# Patient Record
Sex: MALE | Race: WHITE | NOT HISPANIC OR LATINO | Employment: OTHER | URBAN - NONMETROPOLITAN AREA
[De-identification: names, ages, dates, MRNs, and addresses within clinical notes are randomized per-mention and may not be internally consistent; named-entity substitution may affect disease eponyms.]

---

## 2022-04-22 ENCOUNTER — OFFICE VISIT (OUTPATIENT)
Dept: FAMILY MEDICINE CLINIC | Facility: CLINIC | Age: 57
End: 2022-04-22

## 2022-04-22 VITALS
HEART RATE: 79 BPM | OXYGEN SATURATION: 94 % | SYSTOLIC BLOOD PRESSURE: 110 MMHG | HEIGHT: 73 IN | DIASTOLIC BLOOD PRESSURE: 68 MMHG | TEMPERATURE: 97.1 F | WEIGHT: 156.4 LBS | BODY MASS INDEX: 20.73 KG/M2

## 2022-04-22 DIAGNOSIS — N52.9 ERECTILE DYSFUNCTION, UNSPECIFIED ERECTILE DYSFUNCTION TYPE: ICD-10-CM

## 2022-04-22 DIAGNOSIS — R63.4 WEIGHT LOSS, ABNORMAL: Primary | ICD-10-CM

## 2022-04-22 PROBLEM — J43.9 PULMONARY EMPHYSEMA: Status: ACTIVE | Noted: 2022-04-22

## 2022-04-22 PROBLEM — E78.5 HYPERLIPIDEMIA: Status: ACTIVE | Noted: 2022-04-22

## 2022-04-22 PROCEDURE — 36415 COLL VENOUS BLD VENIPUNCTURE: CPT | Performed by: FAMILY MEDICINE

## 2022-04-22 PROCEDURE — 99214 OFFICE O/P EST MOD 30 MIN: CPT | Performed by: FAMILY MEDICINE

## 2022-04-22 RX ORDER — SILDENAFIL 50 MG/1
50 TABLET, FILM COATED ORAL DAILY PRN
Qty: 30 TABLET | Refills: 5 | Status: SHIPPED | OUTPATIENT
Start: 2022-04-22

## 2022-04-22 RX ORDER — ALBUTEROL SULFATE 90 UG/1
AEROSOL, METERED RESPIRATORY (INHALATION) SEE ADMIN INSTRUCTIONS
COMMUNITY
Start: 2022-02-09 | End: 2023-03-13

## 2022-04-22 RX ORDER — FLUTICASONE PROPIONATE 50 MCG
SPRAY, SUSPENSION (ML) NASAL
COMMUNITY
Start: 2022-03-09

## 2022-04-22 NOTE — ASSESSMENT & PLAN NOTE
I am going to recheck thyroid levels today and if normal we will proceed with CT chest, abdomen and pelvis with contrast.

## 2022-04-22 NOTE — PROGRESS NOTES
Date: 2022   Patient Name: Errol Cortez  : 1965   MRN: [id number]     Chief Complaint:    Chief Complaint   Patient presents with   • Weight Loss     Patient states he is drinking 2 boost a day, eats 3 meals a day, last weight documented was 166 on 2022   • Erectile Dysfunction     Patient would like to discuss medication options        History of Present Illness: Errol Cortez is a 56 y.o. male who is here today for Follow-up for unintentional weight loss.  He has lost another 10 pounds since January.  He continues to eat normally 3 meals a day and has added 2 boost supplements daily with persistent weight loss.  Most recent lab work in January was normal although TSH was on the lower end of normal.  His only medical history is COPD and history of opiate dependence currently on Suboxone which has been stable for more than 2 years.  His most recent colonoscopy 6 years ago was normal.  He denies family history of colon or prostate cancer.  PSA in January was also normal.  He had a CT chest without contrast in 2021 which only revealed severe emphysema.    He also complains today of erectile dysfunction.  He reports difficulty achieving and maintaining an erection.  He does still have some morning erections.           Review of Systems:   Review of Systems   Constitutional: Positive for unexpected weight loss. Negative for fatigue.   Eyes: Negative for blurred vision.   Respiratory: Negative for cough, chest tightness and shortness of breath.    Cardiovascular: Negative for chest pain, palpitations and leg swelling.   Gastrointestinal: Negative for abdominal pain, constipation, diarrhea, nausea and vomiting.   Genitourinary: Positive for erectile dysfunction. Negative for difficulty urinating and dysuria.   Musculoskeletal: Negative for back pain and myalgias.   Skin: Negative for rash.   Neurological: Negative for dizziness, tremors and headache.   Hematological: Negative for  adenopathy.   Psychiatric/Behavioral: Negative for depressed mood. The patient is not nervous/anxious.        Past Medical History:   Past Medical History:   Diagnosis Date   • Emphysema lung (HCC)    • Hyperlipidemia     UNSPECIFIED   • Lung collapse 1996   • Opiate dependence (HCC)     CURRENTLY ON SUBOXONE   • Pulmonary emphysema (HCC)        Past Surgical History:   Past Surgical History:   Procedure Laterality Date   • LUNG REMOVAL, PARTIAL Left        Family History:   Family History   Problem Relation Age of Onset   • COPD Mother    • COPD Father    • COPD Sister    • COPD Brother    • Lung cancer Cousin        Social History:   Social History     Socioeconomic History   • Marital status:    • Number of children: 2   Tobacco Use   • Smoking status: Current Some Day Smoker     Packs/day: 0.50     Years: 40.00     Pack years: 20.00     Types: Cigarettes   • Smokeless tobacco: Never Used   Vaping Use   • Vaping Use: Never used   Substance and Sexual Activity   • Alcohol use: Never   • Drug use: Yes     Types: Marijuana   • Sexual activity: Defer       Medications:     Current Outpatient Medications:   •  Advair Diskus 500-50 MCG/DOSE DISKUS, Inhale 1 puff 2 (Two) Times a Day., Disp: , Rfl:   •  albuterol sulfate  (90 Base) MCG/ACT inhaler, Inhale See Admin Instructions. Inhale 2 puffs by mouth every 4 to 6 hours as needed, Disp: , Rfl:   •  fluticasone (FLONASE) 50 MCG/ACT nasal spray, SHAKE GENTLY AND INHALE 2 PUFFS IN EACH NOSTRIL EVERY DAY, Disp: , Rfl:   •  Incruse Ellipta 62.5 MCG/INH aerosol powder , INHALE 1 PUFF BY MOUTH 1 TIME EACH DAY, Disp: , Rfl:   •  sildenafil (Viagra) 50 MG tablet, Take 1 tablet by mouth Daily As Needed for Erectile Dysfunction., Disp: 30 tablet, Rfl: 5    Allergies:   No Known Allergies      Physical Exam:  Vital Signs:   Vitals:    04/22/22 0920   BP: 110/68   BP Location: Left arm   Patient Position: Sitting   Cuff Size: Adult   Pulse: 79   Temp: 97.1 °F (36.2  "°C)   TempSrc: Temporal   SpO2: 94%   Weight: 70.9 kg (156 lb 6.4 oz)   Height: 185.4 cm (73\")     Body mass index is 20.63 kg/m².     Physical Exam  Vitals and nursing note reviewed.   Constitutional:       Appearance: He is normal weight.      Comments: Chronically ill-appearing   HENT:      Head: Normocephalic and atraumatic.   Cardiovascular:      Rate and Rhythm: Normal rate and regular rhythm.      Heart sounds: Normal heart sounds. No murmur heard.  Pulmonary:      Effort: Pulmonary effort is normal.      Breath sounds: Normal breath sounds. No wheezing.   Abdominal:      General: Bowel sounds are normal.      Palpations: Abdomen is soft.   Lymphadenopathy:      Cervical: No cervical adenopathy.   Skin:     General: Skin is warm.   Neurological:      General: No focal deficit present.      Mental Status: He is alert and oriented to person, place, and time.   Psychiatric:         Mood and Affect: Mood normal.         Behavior: Behavior normal.           Assessment/Plan:   Diagnoses and all orders for this visit:    1. Weight loss, abnormal (Primary)  Assessment & Plan:  I am going to recheck thyroid levels today and if normal we will proceed with CT chest, abdomen and pelvis with contrast.    Orders:  -     TSH; Future  -     T4, Free; Future    2. Erectile dysfunction, unspecified erectile dysfunction type  Assessment & Plan:  Rx sildenafil for patient to use as needed    Orders:  -     sildenafil (Viagra) 50 MG tablet; Take 1 tablet by mouth Daily As Needed for Erectile Dysfunction.  Dispense: 30 tablet; Refill: 5         Follow Up:   Return in about 1 month (around 5/22/2022) for Med Recheck.    Karmen Yousif,   [facility]    "

## 2022-04-23 LAB
T4 FREE SERPL-MCNC: 1.46 NG/DL (ref 0.82–1.77)
TSH SERPL DL<=0.005 MIU/L-ACNC: 1.3 UIU/ML (ref 0.45–4.5)

## 2022-05-23 ENCOUNTER — OFFICE VISIT (OUTPATIENT)
Dept: FAMILY MEDICINE CLINIC | Facility: CLINIC | Age: 57
End: 2022-05-23

## 2022-05-23 DIAGNOSIS — R63.4 WEIGHT LOSS, ABNORMAL: Primary | ICD-10-CM

## 2022-05-23 PROCEDURE — 99213 OFFICE O/P EST LOW 20 MIN: CPT | Performed by: FAMILY MEDICINE

## 2022-05-23 NOTE — PROGRESS NOTES
Patient was seen today through synchronous audio/video technology. Verbal consent was obtained. The patient was located at home. Vitals signs were not obtained due to lack of home monitoring access. Time spent with patient was 10 minutes.       Date: 2022   Patient Name: Errol Cortez  : 1965   MRN: [id number]     Chief Complaint:    Chief Complaint   Patient presents with   • Weight Loss       History of Present Illness: Errol Cortez is a 56 y.o. male who is here today to follow up for Abnormal weight loss.  He reports he continues to lose weight despite not changing any physical activity or diet.  He has lost over 60 pounds in the past year unintentionally.  Labs have all been normal, he has had CT of his chest without contrast which just showed severe emphysema.  He denies other symptoms but has not had any more work-up for this.           Review of Systems:   Review of Systems   Constitutional: Positive for fatigue and unexpected weight loss.   Eyes: Negative for blurred vision.   Respiratory: Positive for cough and wheezing. Negative for chest tightness and shortness of breath.    Cardiovascular: Negative for chest pain, palpitations and leg swelling.   Gastrointestinal: Negative for abdominal pain, constipation, diarrhea, nausea and vomiting.   Neurological: Negative for dizziness, tremors and headache.   Psychiatric/Behavioral: Negative for depressed mood. The patient is not nervous/anxious.        Past Medical History:   Past Medical History:   Diagnosis Date   • Emphysema lung (HCC)    • Hyperlipidemia     UNSPECIFIED   • Lung collapse    • Opiate dependence (HCC)     CURRENTLY ON SUBOXONE   • Pulmonary emphysema (HCC)        Past Surgical History:   Past Surgical History:   Procedure Laterality Date   • LUNG REMOVAL, PARTIAL Left        Family History:   Family History   Problem Relation Age of Onset   • COPD Mother    • COPD Father    • COPD Sister    • COPD Brother    • Lung cancer Cousin         Social History:   Social History     Socioeconomic History   • Marital status:    • Number of children: 2   Tobacco Use   • Smoking status: Current Some Day Smoker     Packs/day: 0.50     Years: 40.00     Pack years: 20.00     Types: Cigarettes   • Smokeless tobacco: Never Used   Vaping Use   • Vaping Use: Never used   Substance and Sexual Activity   • Alcohol use: Never   • Drug use: Yes     Types: Marijuana   • Sexual activity: Defer       Medications:     Current Outpatient Medications:   •  Advair Diskus 500-50 MCG/DOSE DISKUS, Inhale 1 puff 2 (Two) Times a Day., Disp: , Rfl:   •  albuterol sulfate  (90 Base) MCG/ACT inhaler, Inhale See Admin Instructions. Inhale 2 puffs by mouth every 4 to 6 hours as needed, Disp: , Rfl:   •  fluticasone (FLONASE) 50 MCG/ACT nasal spray, SHAKE GENTLY AND INHALE 2 PUFFS IN EACH NOSTRIL EVERY DAY, Disp: , Rfl:   •  Incruse Ellipta 62.5 MCG/INH aerosol powder , INHALE 1 PUFF BY MOUTH 1 TIME EACH DAY, Disp: , Rfl:   •  sildenafil (Viagra) 50 MG tablet, Take 1 tablet by mouth Daily As Needed for Erectile Dysfunction., Disp: 30 tablet, Rfl: 5    Allergies:   No Known Allergies    PHQ-2 Total Score:     PHQ-9 Total Score:       Physical Exam:  Vital Signs: There were no vitals filed for this visit.  There is no height or weight on file to calculate BMI.     Physical Exam  Vitals and nursing note reviewed.   Pulmonary:      Effort: Pulmonary effort is normal.   Neurological:      Mental Status: He is alert and oriented to person, place, and time.   Psychiatric:         Mood and Affect: Mood normal.           Assessment/Plan:   Diagnoses and all orders for this visit:    1. Weight loss, abnormal (Primary)  Assessment & Plan:  Patient continues to lose a significant amount of weight despite not changing diet or physical activity.  All lab work has been normal thus far and he has had a CT chest without contrast significant only for severe emphysema.  We will proceed  with imaging of his chest/abdomen/pelvis to rule out potential malignancy.    Orders:  -     CT Chest With & Without Contrast; Future  -     CT Abdomen Pelvis With & Without Contrast; Future         Follow Up:   Return for After imaging is complete.    Karmen Yousif DO  [facility] [place] [facility]

## 2022-05-23 NOTE — ASSESSMENT & PLAN NOTE
Patient continues to lose a significant amount of weight despite not changing diet or physical activity.  All lab work has been normal thus far and he has had a CT chest without contrast significant only for severe emphysema.  We will proceed with imaging of his chest/abdomen/pelvis to rule out potential malignancy.

## 2022-10-13 ENCOUNTER — OFFICE VISIT (OUTPATIENT)
Dept: FAMILY MEDICINE CLINIC | Facility: CLINIC | Age: 57
End: 2022-10-13

## 2022-10-13 VITALS
DIASTOLIC BLOOD PRESSURE: 76 MMHG | SYSTOLIC BLOOD PRESSURE: 118 MMHG | HEIGHT: 73 IN | WEIGHT: 145.5 LBS | HEART RATE: 86 BPM | TEMPERATURE: 96.9 F | OXYGEN SATURATION: 91 % | BODY MASS INDEX: 19.28 KG/M2

## 2022-10-13 DIAGNOSIS — M25.562 CHRONIC PAIN OF LEFT KNEE: Primary | ICD-10-CM

## 2022-10-13 DIAGNOSIS — G89.29 CHRONIC PAIN OF LEFT KNEE: Primary | ICD-10-CM

## 2022-10-13 DIAGNOSIS — R30.0 DYSURIA: ICD-10-CM

## 2022-10-13 PROCEDURE — 99213 OFFICE O/P EST LOW 20 MIN: CPT | Performed by: FAMILY MEDICINE

## 2022-10-13 RX ORDER — CIPROFLOXACIN 500 MG/1
500 TABLET, FILM COATED ORAL 2 TIMES DAILY
Qty: 14 TABLET | Refills: 0 | Status: SHIPPED | OUTPATIENT
Start: 2022-10-13 | End: 2022-12-14

## 2022-10-13 RX ORDER — BUPRENORPHINE AND NALOXONE 8; 2 MG/1; MG/1
1 FILM, SOLUBLE BUCCAL; SUBLINGUAL DAILY
COMMUNITY
End: 2022-12-01

## 2022-10-13 NOTE — PROGRESS NOTES
Date: 10/13/2022   Patient Name: Errol Cortez  : 1965   MRN: [id number]     Chief Complaint:    Chief Complaint   Patient presents with   • Knee Pain     Left knee        History of Present Illness: Errol Cortez is a 56 y.o. male who is here today for Left knee pain.  He reports he has been struggling with left medial knee pain for 2 months and his knee feels unstable to walk on.  He has been wearing a knee brace for stability.  He denies previous or recent injury or trauma.  He reports it did initially swell but is not currently swollen.    He also complains of dysuria but denies hematuria.  He is unable to use the restroom in office today because he urinated prior to the appointment time.           Review of Systems:   Review of Systems   Constitutional: Positive for unexpected weight loss. Negative for fatigue.   Eyes: Negative for blurred vision.   Respiratory: Negative for cough, chest tightness and shortness of breath.    Cardiovascular: Negative for chest pain, palpitations and leg swelling.   Gastrointestinal: Negative for abdominal pain, constipation, diarrhea, nausea and vomiting.   Genitourinary: Positive for dysuria. Negative for hematuria.   Musculoskeletal: Positive for arthralgias, gait problem and joint swelling.   Neurological: Negative for dizziness, tremors and headache.   Psychiatric/Behavioral: Negative for depressed mood. The patient is not nervous/anxious.        Past Medical History:   Past Medical History:   Diagnosis Date   • Emphysema lung (HCC)    • Hyperlipidemia     UNSPECIFIED   • Lung collapse    • Opiate dependence (HCC)     CURRENTLY ON SUBOXONE   • Pulmonary emphysema (HCC)        Past Surgical History:   Past Surgical History:   Procedure Laterality Date   • LUNG REMOVAL, PARTIAL Left        Family History:   Family History   Problem Relation Age of Onset   • COPD Mother    • COPD Father    • COPD Sister    • COPD Brother    • Lung cancer Cousin        Social  "History:   Social History     Socioeconomic History   • Marital status:    • Number of children: 2   Tobacco Use   • Smoking status: Some Days     Packs/day: 0.50     Years: 40.00     Pack years: 20.00     Types: Cigarettes   • Smokeless tobacco: Never   Vaping Use   • Vaping Use: Never used   Substance and Sexual Activity   • Alcohol use: Never   • Drug use: Yes     Types: Marijuana   • Sexual activity: Defer       Medications:     Current Outpatient Medications:   •  Advair Diskus 500-50 MCG/DOSE DISKUS, Inhale 1 puff 2 (Two) Times a Day., Disp: , Rfl:   •  albuterol sulfate  (90 Base) MCG/ACT inhaler, Inhale See Admin Instructions. Inhale 2 puffs by mouth every 4 to 6 hours as needed, Disp: , Rfl:   •  buprenorphine-naloxone (Suboxone) 8-2 MG film film, Place 1 film under the tongue Daily. 2 a day, Disp: , Rfl:   •  fluticasone (FLONASE) 50 MCG/ACT nasal spray, SHAKE GENTLY AND INHALE 2 PUFFS IN EACH NOSTRIL EVERY DAY, Disp: , Rfl:   •  Incruse Ellipta 62.5 MCG/INH aerosol powder , INHALE 1 PUFF BY MOUTH 1 TIME EACH DAY, Disp: , Rfl:   •  sildenafil (Viagra) 50 MG tablet, Take 1 tablet by mouth Daily As Needed for Erectile Dysfunction., Disp: 30 tablet, Rfl: 5  •  ciprofloxacin (Cipro) 500 MG tablet, Take 1 tablet by mouth 2 (Two) Times a Day., Disp: 14 tablet, Rfl: 0    Allergies:   No Known Allergies      Physical Exam:  Vital Signs:   Vitals:    10/13/22 1536   BP: 118/76   BP Location: Right arm   Patient Position: Sitting   Cuff Size: Adult   Pulse: 86   Temp: 96.9 °F (36.1 °C)   TempSrc: Temporal   SpO2: 91%   Weight: 66 kg (145 lb 8 oz)   Height: 185.4 cm (73\")   PainSc:   6   PainLoc: Knee     Body mass index is 19.2 kg/m².     Physical Exam  Vitals and nursing note reviewed.   Constitutional:       Comments: Chronically ill-appearing   Cardiovascular:      Rate and Rhythm: Normal rate and regular rhythm.      Heart sounds: Normal heart sounds. No murmur heard.  Pulmonary:      Effort: " Pulmonary effort is normal.      Breath sounds: Normal breath sounds. No wheezing.   Musculoskeletal:      Comments: Tenderness to palpation of left knee medial joint line.  Negative Lachman.  No joint effusion   Neurological:      Mental Status: He is alert and oriented to person, place, and time. Mental status is at baseline.   Psychiatric:         Mood and Affect: Mood normal.         Behavior: Behavior normal.           Assessment/Plan:   Diagnoses and all orders for this visit:    1. Chronic pain of left knee (Primary)  Assessment & Plan:  X-rays today and we discussed steroid joint injection of the left knee    Orders:  -     XR Knee 3 View Left; Future    2. Dysuria  Assessment & Plan:  Rx Cipro since patient is unable to leave a urine sample today.    Orders:  -     ciprofloxacin (Cipro) 500 MG tablet; Take 1 tablet by mouth 2 (Two) Times a Day.  Dispense: 14 tablet; Refill: 0         Follow Up:   Return if symptoms worsen or fail to improve.    Karmen Yousif, DO  [facility] [place] [facility]

## 2022-12-01 ENCOUNTER — OFFICE VISIT (OUTPATIENT)
Dept: FAMILY MEDICINE CLINIC | Facility: CLINIC | Age: 57
End: 2022-12-01

## 2022-12-01 VITALS
TEMPERATURE: 97.7 F | HEART RATE: 70 BPM | DIASTOLIC BLOOD PRESSURE: 68 MMHG | WEIGHT: 145.7 LBS | SYSTOLIC BLOOD PRESSURE: 110 MMHG | HEIGHT: 73 IN | BODY MASS INDEX: 19.31 KG/M2 | OXYGEN SATURATION: 95 %

## 2022-12-01 DIAGNOSIS — M25.562 CHRONIC PAIN OF LEFT KNEE: Primary | ICD-10-CM

## 2022-12-01 DIAGNOSIS — G89.29 CHRONIC PAIN OF LEFT KNEE: Primary | ICD-10-CM

## 2022-12-01 PROCEDURE — 20610 DRAIN/INJ JOINT/BURSA W/O US: CPT | Performed by: FAMILY MEDICINE

## 2022-12-01 RX ORDER — TRIAMCINOLONE ACETONIDE 40 MG/ML
20 INJECTION, SUSPENSION INTRA-ARTICULAR; INTRAMUSCULAR
Status: COMPLETED | OUTPATIENT
Start: 2022-12-01 | End: 2022-12-01

## 2022-12-01 RX ORDER — TRIAMCINOLONE ACETONIDE 40 MG/ML
40 INJECTION, SUSPENSION INTRA-ARTICULAR; INTRAMUSCULAR
Status: COMPLETED | OUTPATIENT
Start: 2022-12-01 | End: 2022-12-01

## 2022-12-01 RX ORDER — BUDESONIDE 0.5 MG/2ML
INHALANT ORAL
COMMUNITY
Start: 2022-10-25

## 2022-12-01 RX ORDER — BUPRENORPHINE HYDROCHLORIDE AND NALOXONE HYDROCHLORIDE DIHYDRATE 8; 2 MG/1; MG/1
TABLET SUBLINGUAL
COMMUNITY
Start: 2022-11-23

## 2022-12-01 RX ADMIN — TRIAMCINOLONE ACETONIDE 20 MG: 40 INJECTION, SUSPENSION INTRA-ARTICULAR; INTRAMUSCULAR at 14:14

## 2022-12-01 RX ADMIN — TRIAMCINOLONE ACETONIDE 40 MG: 40 INJECTION, SUSPENSION INTRA-ARTICULAR; INTRAMUSCULAR at 14:14

## 2022-12-01 NOTE — ASSESSMENT & PLAN NOTE
Discussed risks of joint injection including bleeding and infection.  Patient verbalized understanding and agreed to proceed with the procedure.  Patient was instructed to limit physical activity for the next 24 hours, ice the area this evening, and call for any questions or concerns.

## 2022-12-01 NOTE — PROGRESS NOTES
Date: 2022   Patient Name: Errol Cortez  : 1965   MRN: [id number]     Chief Complaint:    Chief Complaint   Patient presents with   • Knee Pain     Patient is here for steroid injection        History of Present Illness: Errol Cortez is a 57 y.o. male who is here today for Left knee joint injection.  Most recent x-ray was significant for osteoarthritis of the left knee.  He presents today to proceed with joint injection.           Review of Systems:   Review of Systems   Constitutional: Negative for fatigue.   Eyes: Negative for blurred vision.   Respiratory: Negative for cough, chest tightness and shortness of breath.    Cardiovascular: Negative for chest pain, palpitations and leg swelling.   Gastrointestinal: Negative for abdominal pain, constipation, diarrhea, nausea and vomiting.   Musculoskeletal: Positive for arthralgias and joint swelling.   Neurological: Negative for dizziness, tremors and headache.   Psychiatric/Behavioral: Negative for depressed mood. The patient is not nervous/anxious.        Past Medical History:   Past Medical History:   Diagnosis Date   • Emphysema lung (HCC)    • Hyperlipidemia     UNSPECIFIED   • Lung collapse    • Opiate dependence (HCC)     CURRENTLY ON SUBOXONE   • Pulmonary emphysema (HCC)        Past Surgical History:   Past Surgical History:   Procedure Laterality Date   • LUNG REMOVAL, PARTIAL Left        Family History:   Family History   Problem Relation Age of Onset   • COPD Mother    • COPD Father    • COPD Sister    • COPD Brother    • Lung cancer Cousin        Social History:   Social History     Socioeconomic History   • Marital status:    • Number of children: 2   Tobacco Use   • Smoking status: Some Days     Packs/day: 0.50     Years: 40.00     Pack years: 20.00     Types: Cigarettes   • Smokeless tobacco: Never   Vaping Use   • Vaping Use: Never used   Substance and Sexual Activity   • Alcohol use: Never   • Drug use: Yes     Types:  "Marijuana   • Sexual activity: Defer       Medications:     Current Outpatient Medications:   •  Advair Diskus 500-50 MCG/DOSE DISKUS, Inhale 1 puff 2 (Two) Times a Day., Disp: , Rfl:   •  albuterol sulfate  (90 Base) MCG/ACT inhaler, Inhale See Admin Instructions. Inhale 2 puffs by mouth every 4 to 6 hours as needed, Disp: , Rfl:   •  budesonide (PULMICORT) 0.5 MG/2ML nebulizer solution, , Disp: , Rfl:   •  buprenorphine-naloxone (SUBOXONE) 8-2 MG per SL tablet, DISSOLVE 2 TABLET UNDER THE TONGUE EVERY DAY, Disp: , Rfl:   •  ciprofloxacin (Cipro) 500 MG tablet, Take 1 tablet by mouth 2 (Two) Times a Day., Disp: 14 tablet, Rfl: 0  •  fluticasone (FLONASE) 50 MCG/ACT nasal spray, SHAKE GENTLY AND INHALE 2 PUFFS IN EACH NOSTRIL EVERY DAY, Disp: , Rfl:   •  Incruse Ellipta 62.5 MCG/INH aerosol powder , INHALE 1 PUFF BY MOUTH 1 TIME EACH DAY, Disp: , Rfl:   •  sildenafil (Viagra) 50 MG tablet, Take 1 tablet by mouth Daily As Needed for Erectile Dysfunction., Disp: 30 tablet, Rfl: 5    Allergies:   No Known Allergies      Physical Exam:  Vital Signs:   Vitals:    12/01/22 1358   BP: 110/68   BP Location: Right arm   Patient Position: Sitting   Cuff Size: Adult   Pulse: 70   Temp: 97.7 °F (36.5 °C)   TempSrc: Temporal   SpO2: 95%   Weight: 66.1 kg (145 lb 11.2 oz)   Height: 185.4 cm (73\")   PainSc:   4   PainLoc: Knee     Body mass index is 19.22 kg/m².     Physical Exam  Vitals and nursing note reviewed.   Constitutional:       Appearance: Normal appearance.   HENT:      Head: Normocephalic.   Pulmonary:      Effort: Pulmonary effort is normal.   Musculoskeletal:         General: Tenderness present. No swelling. Normal range of motion.   Neurological:      Mental Status: He is alert and oriented to person, place, and time.   Psychiatric:         Mood and Affect: Mood normal.         Behavior: Behavior normal.       Arthrocentesis    Date/Time: 12/1/2022 2:14 PM  Performed by: Karmen Yousif, "   Authorized by: Karmen Yousif DO   Indications: pain   Body area: knee  Joint: left knee  Local anesthesia used: no    Anesthesia:  Local anesthesia used: no    Sedation:  Patient sedated: no    Preparation: Patient was prepped and draped in the usual sterile fashion.  Needle size: 22 G  Ultrasound guidance: no  Approach: medial  Meds administered: 40 mg triamcinolone acetonide 40 MG/ML; 20 mg triamcinolone acetonide 40 MG/ML  Patient tolerance: patient tolerated the procedure well with no immediate complications  Comments: Medications Administered:  Kenalog 60 mg  See MAR for medication dosage, NDC, lot number, and expiration date.            Assessment/Plan:   Diagnoses and all orders for this visit:    1. Chronic pain of left knee (Primary)  Assessment & Plan:  Discussed risks of joint injection including bleeding and infection.  Patient verbalized understanding and agreed to proceed with the procedure.  Patient was instructed to limit physical activity for the next 24 hours, ice the area this evening, and call for any questions or concerns.        Other orders  -     Arthrocentesis         Follow Up:   Return if symptoms worsen or fail to improve.    Karmen Yousif DO  [facility] [place] [facility]

## 2022-12-14 ENCOUNTER — TELEMEDICINE (OUTPATIENT)
Dept: FAMILY MEDICINE CLINIC | Facility: CLINIC | Age: 57
End: 2022-12-14

## 2022-12-14 DIAGNOSIS — R05.1 ACUTE COUGH: ICD-10-CM

## 2022-12-14 DIAGNOSIS — J10.1 INFLUENZA A: Primary | ICD-10-CM

## 2022-12-14 LAB
EXPIRATION DATE: ABNORMAL
FLUAV AG UPPER RESP QL IA.RAPID: DETECTED
FLUBV AG UPPER RESP QL IA.RAPID: NOT DETECTED
INTERNAL CONTROL: ABNORMAL
Lab: ABNORMAL
SARS-COV-2 RNA RESP QL NAA+PROBE: NOT DETECTED

## 2022-12-14 PROCEDURE — 87428 SARSCOV & INF VIR A&B AG IA: CPT | Performed by: FAMILY MEDICINE

## 2022-12-14 PROCEDURE — 99213 OFFICE O/P EST LOW 20 MIN: CPT | Performed by: FAMILY MEDICINE

## 2022-12-14 RX ORDER — OSELTAMIVIR PHOSPHATE 75 MG/1
75 CAPSULE ORAL 2 TIMES DAILY
Qty: 10 CAPSULE | Refills: 0 | Status: SHIPPED | OUTPATIENT
Start: 2022-12-14 | End: 2023-03-13

## 2022-12-14 RX ORDER — DEXTROMETHORPHAN HYDROBROMIDE AND PROMETHAZINE HYDROCHLORIDE 15; 6.25 MG/5ML; MG/5ML
5 SYRUP ORAL 4 TIMES DAILY PRN
Qty: 240 ML | Refills: 0 | Status: SHIPPED | OUTPATIENT
Start: 2022-12-14 | End: 2023-03-13

## 2022-12-14 NOTE — PROGRESS NOTES
Patient was seen today through synchronous audio/video technology. Verbal consent was obtained. The patient was located at home. Dr. Yousif was located at [facility] in [place], [place]. Vitals signs were not obtained due to lack of home monitoring access.          Date: 2022   Patient Name: Errol Cortez  : 1965   MRN: [id number]     Chief Complaint:    Chief Complaint   Patient presents with   • Cough     Headache, body aches, symptoms began 3 days ago, at home covid test is negative        History of Present Illness: Errol Cortez is a 57 y.o. male who is here today for Headache, body aches, chills, cough and congestion started 3 days ago.  He has taken 2 home COVID test that have both been negative.  He denies recent known sick contacts.  He denies nausea, vomiting or diarrhea.           Review of Systems:   Review of Systems   Constitutional: Positive for chills and fatigue.   HENT: Positive for congestion, ear pain and sinus pressure.    Eyes: Negative for blurred vision.   Respiratory: Positive for cough. Negative for chest tightness and shortness of breath.    Cardiovascular: Negative for chest pain, palpitations and leg swelling.   Gastrointestinal: Negative for abdominal pain, constipation, diarrhea, nausea and vomiting.   Neurological: Negative for dizziness, tremors and headache.   Psychiatric/Behavioral: Negative for depressed mood. The patient is not nervous/anxious.        Past Medical History:   Past Medical History:   Diagnosis Date   • Emphysema lung (HCC)    • Hyperlipidemia     UNSPECIFIED   • Lung collapse    • Opiate dependence (HCC)     CURRENTLY ON SUBOXONE   • Pulmonary emphysema (HCC)        Past Surgical History:   Past Surgical History:   Procedure Laterality Date   • LUNG REMOVAL, PARTIAL Left        Family History:   Family History   Problem Relation Age of Onset   • COPD Mother    • COPD Father    • COPD Sister    • COPD Brother    • Lung cancer Cousin         Social History:   Social History     Socioeconomic History   • Marital status:    • Number of children: 2   Tobacco Use   • Smoking status: Some Days     Packs/day: 0.50     Years: 40.00     Pack years: 20.00     Types: Cigarettes   • Smokeless tobacco: Never   Vaping Use   • Vaping Use: Never used   Substance and Sexual Activity   • Alcohol use: Never   • Drug use: Yes     Types: Marijuana   • Sexual activity: Defer       Medications:     Current Outpatient Medications:   •  Advair Diskus 500-50 MCG/DOSE DISKUS, Inhale 1 puff 2 (Two) Times a Day., Disp: , Rfl:   •  albuterol sulfate  (90 Base) MCG/ACT inhaler, Inhale See Admin Instructions. Inhale 2 puffs by mouth every 4 to 6 hours as needed, Disp: , Rfl:   •  budesonide (PULMICORT) 0.5 MG/2ML nebulizer solution, , Disp: , Rfl:   •  buprenorphine-naloxone (SUBOXONE) 8-2 MG per SL tablet, DISSOLVE 2 TABLET UNDER THE TONGUE EVERY DAY, Disp: , Rfl:   •  fluticasone (FLONASE) 50 MCG/ACT nasal spray, SHAKE GENTLY AND INHALE 2 PUFFS IN EACH NOSTRIL EVERY DAY, Disp: , Rfl:   •  Incruse Ellipta 62.5 MCG/INH aerosol powder , INHALE 1 PUFF BY MOUTH 1 TIME EACH DAY, Disp: , Rfl:   •  sildenafil (Viagra) 50 MG tablet, Take 1 tablet by mouth Daily As Needed for Erectile Dysfunction., Disp: 30 tablet, Rfl: 5  •  oseltamivir (Tamiflu) 75 MG capsule, Take 1 capsule by mouth 2 (Two) Times a Day., Disp: 10 capsule, Rfl: 0  •  promethazine-dextromethorphan (PROMETHAZINE-DM) 6.25-15 MG/5ML syrup, Take 5 mL by mouth 4 (Four) Times a Day As Needed for Cough., Disp: 240 mL, Rfl: 0    Allergies:   No Known Allergies      Physical Exam:  Vital Signs: There were no vitals filed for this visit.  There is no height or weight on file to calculate BMI.     Physical Exam  Vitals and nursing note reviewed.   Constitutional:       Appearance: Normal appearance.   HENT:      Head: Normocephalic.   Eyes:      Conjunctiva/sclera: Conjunctivae normal.   Pulmonary:      Effort:  Pulmonary effort is normal.   Neurological:      Mental Status: He is alert and oriented to person, place, and time.   Psychiatric:         Mood and Affect: Mood normal.         Behavior: Behavior normal.           Assessment/Plan:   Diagnoses and all orders for this visit:    1. Influenza A (Primary)  Assessment & Plan:  Rx Tamiflu and Promethazine DM to use as needed.  Side effects discussed.  Patient was instructed to call or go to the ED should symptoms acutely worsen.    Orders:  -     oseltamivir (Tamiflu) 75 MG capsule; Take 1 capsule by mouth 2 (Two) Times a Day.  Dispense: 10 capsule; Refill: 0  -     promethazine-dextromethorphan (PROMETHAZINE-DM) 6.25-15 MG/5ML syrup; Take 5 mL by mouth 4 (Four) Times a Day As Needed for Cough.  Dispense: 240 mL; Refill: 0    2. Acute cough  -     Covid-19 + Flu A&B AG, Veritor         Follow Up:   Return if symptoms worsen or fail to improve.    Karmen Yousif, DO  [facility]

## 2022-12-14 NOTE — ASSESSMENT & PLAN NOTE
Rx Tamiflu and Promethazine DM to use as needed.  Side effects discussed.  Patient was instructed to call or go to the ED should symptoms acutely worsen.

## 2022-12-22 ENCOUNTER — TELEPHONE (OUTPATIENT)
Dept: FAMILY MEDICINE CLINIC | Facility: CLINIC | Age: 57
End: 2022-12-22

## 2023-02-02 ENCOUNTER — TELEPHONE (OUTPATIENT)
Dept: FAMILY MEDICINE CLINIC | Facility: CLINIC | Age: 58
End: 2023-02-02

## 2023-02-02 NOTE — TELEPHONE ENCOUNTER
Caller: Errol Cortez    Relationship: Self    Best call back number: [phone]    What orders are you requesting (i.e. lab or imaging): CPAP MACHINE    In what timeframe would the patient need to come in: NA    Where will you receive your lab/imaging services: NA    Additional notes: PATIENT REQUESTS CALL BACK FOR FOLLOW UP REGARDING CPAP MACHINE REQUEST

## 2023-03-13 ENCOUNTER — OFFICE VISIT (OUTPATIENT)
Dept: FAMILY MEDICINE CLINIC | Facility: CLINIC | Age: 58
End: 2023-03-13
Payer: MEDICARE

## 2023-03-13 VITALS
HEART RATE: 86 BPM | OXYGEN SATURATION: 95 % | HEIGHT: 73 IN | SYSTOLIC BLOOD PRESSURE: 142 MMHG | TEMPERATURE: 97.7 F | BODY MASS INDEX: 18.49 KG/M2 | WEIGHT: 139.5 LBS | DIASTOLIC BLOOD PRESSURE: 84 MMHG

## 2023-03-13 DIAGNOSIS — D72.829 LEUKOCYTOSIS, UNSPECIFIED TYPE: ICD-10-CM

## 2023-03-13 DIAGNOSIS — A31.0 MAI (MYCOBACTERIUM AVIUM-INTRACELLULARE): Primary | ICD-10-CM

## 2023-03-13 PROCEDURE — 36415 COLL VENOUS BLD VENIPUNCTURE: CPT | Performed by: FAMILY MEDICINE

## 2023-03-13 PROCEDURE — 1160F RVW MEDS BY RX/DR IN RCRD: CPT | Performed by: FAMILY MEDICINE

## 2023-03-13 PROCEDURE — 1159F MED LIST DOCD IN RCRD: CPT | Performed by: FAMILY MEDICINE

## 2023-03-13 PROCEDURE — 99214 OFFICE O/P EST MOD 30 MIN: CPT | Performed by: FAMILY MEDICINE

## 2023-03-13 RX ORDER — ONDANSETRON 8 MG/1
8 TABLET, ORALLY DISINTEGRATING ORAL EVERY 8 HOURS PRN
Qty: 30 TABLET | Refills: 5 | Status: SHIPPED | OUTPATIENT
Start: 2023-03-13

## 2023-03-13 RX ORDER — OMEPRAZOLE 40 MG/1
40 CAPSULE, DELAYED RELEASE ORAL DAILY
Qty: 30 CAPSULE | Refills: 5 | Status: SHIPPED | OUTPATIENT
Start: 2023-03-13

## 2023-03-13 RX ORDER — GLYCOPYRROLATE AND FORMOTEROL FUMARATE 9; 4.8 UG/1; UG/1
AEROSOL, METERED RESPIRATORY (INHALATION)
COMMUNITY
Start: 2023-02-28

## 2023-03-13 NOTE — PROGRESS NOTES
Date: 2023   Patient Name: Errol Cortez  : 1965   MRN: [id number]     Chief Complaint:    Chief Complaint   Patient presents with   • Bacterial Infection     Recent elevated WBC        History of Present Illness: Errol Cortez is a 57 y.o. male who is here today for Repeat labs and follow-up from recent appointment with infectious disease.  He was found to have a left apical cavitary lesion on recent chest imaging.  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  He is currently following with infectious disease and awaiting sensitivity studies with plans to start IV antibiotic treatment.  He requests repeat CBC today to reassess leukocytosis.           Review of Systems:   Review of Systems   Constitutional: Positive for unexpected weight loss. Negative for fatigue.   Eyes: Negative for blurred vision.   Respiratory: Positive for cough and shortness of breath. Negative for chest tightness.    Cardiovascular: Negative for chest pain, palpitations and leg swelling.   Gastrointestinal: Negative for abdominal pain, constipation, diarrhea, nausea and vomiting.   Neurological: Negative for dizziness, tremors and headache.   Psychiatric/Behavioral: Negative for depressed mood. The patient is not nervous/anxious.        Past Medical History:   Past Medical History:   Diagnosis Date   • Emphysema lung (HCC)    • Hyperlipidemia     UNSPECIFIED   • Lung collapse    • Opiate dependence (HCC)     CURRENTLY ON SUBOXONE   • Pulmonary emphysema (HCC)        Past Surgical History:   Past Surgical History:   Procedure Laterality Date   • LUNG REMOVAL, PARTIAL Left        Family History:   Family History   Problem Relation Age of Onset   • COPD Mother    • COPD Father    • COPD Sister    • COPD Brother    • Lung cancer Cousin        Social History:   Social History     Socioeconomic History   • Marital status:    • Number of children: 2   Tobacco Use   • Smoking status: Some Days     " Packs/day: 0.50     Years: 40.00     Pack years: 20.00     Types: Cigarettes   • Smokeless tobacco: Never   Vaping Use   • Vaping Use: Never used   Substance and Sexual Activity   • Alcohol use: Never   • Drug use: Yes     Types: Marijuana   • Sexual activity: Defer       Medications:     Current Outpatient Medications:   •  Bevespi Aerosphere 9-4.8 MCG/ACT aerosol, INHALE 2 PUFFS BY MOUTH 1 TIME EACH DAY, Disp: , Rfl:   •  budesonide (PULMICORT) 0.5 MG/2ML nebulizer solution, , Disp: , Rfl:   •  buprenorphine-naloxone (SUBOXONE) 8-2 MG per SL tablet, DISSOLVE 2 TABLET UNDER THE TONGUE EVERY DAY, Disp: , Rfl:   •  fluticasone (FLONASE) 50 MCG/ACT nasal spray, SHAKE GENTLY AND INHALE 2 PUFFS IN EACH NOSTRIL EVERY DAY, Disp: , Rfl:   •  sildenafil (Viagra) 50 MG tablet, Take 1 tablet by mouth Daily As Needed for Erectile Dysfunction., Disp: 30 tablet, Rfl: 5  •  omeprazole (priLOSEC) 40 MG capsule, Take 1 capsule by mouth Daily., Disp: 30 capsule, Rfl: 5  •  ondansetron ODT (ZOFRAN-ODT) 8 MG disintegrating tablet, Place 1 tablet on the tongue Every 8 (Eight) Hours As Needed for Nausea or Vomiting., Disp: 30 tablet, Rfl: 5    Allergies:   No Known Allergies      Physical Exam:  Vital Signs:   Vitals:    03/13/23 1512   BP: 142/84   BP Location: Left arm   Patient Position: Sitting   Cuff Size: Adult   Pulse: 86   Temp: 97.7 °F (36.5 °C)   TempSrc: Temporal   SpO2: 95%   Weight: 63.3 kg (139 lb 8 oz)   Height: 185.4 cm (73\")  Comment: pt reported     Body mass index is 18.4 kg/m².     Physical Exam  Vitals and nursing note reviewed.   Constitutional:       Comments: Chronically ill-appearing   Cardiovascular:      Rate and Rhythm: Normal rate and regular rhythm.      Heart sounds: Normal heart sounds. No murmur heard.  Pulmonary:      Effort: Pulmonary effort is normal.      Breath sounds: No wheezing.      Comments: Hacking cough  Skin:     General: Skin is warm.   Neurological:      Mental Status: He is alert and " oriented to person, place, and time. Mental status is at baseline.   Psychiatric:         Mood and Affect: Mood normal.         Behavior: Behavior normal.           Assessment/Plan:   Diagnoses and all orders for this visit:    1. ANGELA (mycobacterium avium-intracellulare) ([facility]) (Primary)  Assessment & Plan:  Currently following with pulmonology and infectious disease.  Awaiting sensitivity studies to determine IV antibiotic treatment.    I spent 30 minutes reviewing recent medical records, discussing current symptoms with the patient, discussing treatment plan, and on documentation.      2. Leukocytosis, unspecified type  Assessment & Plan:  Repeat CBC today    Orders:  -     CBC Auto Differential; Future  -     CBC Auto Differential    Other orders  -     omeprazole (priLOSEC) 40 MG capsule; Take 1 capsule by mouth Daily.  Dispense: 30 capsule; Refill: 5  -     ondansetron ODT (ZOFRAN-ODT) 8 MG disintegrating tablet; Place 1 tablet on the tongue Every 8 (Eight) Hours As Needed for Nausea or Vomiting.  Dispense: 30 tablet; Refill: 5         Follow Up:   Return in about 6 months (around 9/13/2023) for Annual physical, Medicare Wellness.    Karmen Yousif DO  [facility]

## 2023-03-14 LAB
BASOPHILS # BLD AUTO: 0.1 X10E3/UL (ref 0–0.2)
BASOPHILS NFR BLD AUTO: 1 %
EOSINOPHIL # BLD AUTO: 0.2 X10E3/UL (ref 0–0.4)
EOSINOPHIL NFR BLD AUTO: 2 %
ERYTHROCYTE [DISTWIDTH] IN BLOOD BY AUTOMATED COUNT: 12.5 % (ref 11.6–15.4)
HCT VFR BLD AUTO: 40.3 % (ref 37.5–51)
HGB BLD-MCNC: 13.7 G/DL (ref 13–17.7)
IMM GRANULOCYTES # BLD AUTO: 0 X10E3/UL (ref 0–0.1)
IMM GRANULOCYTES NFR BLD AUTO: 0 %
LYMPHOCYTES # BLD AUTO: 3 X10E3/UL (ref 0.7–3.1)
LYMPHOCYTES NFR BLD AUTO: 27 %
MCH RBC QN AUTO: 28.8 PG (ref 26.6–33)
MCHC RBC AUTO-ENTMCNC: 34 G/DL (ref 31.5–35.7)
MCV RBC AUTO: 85 FL (ref 79–97)
MONOCYTES # BLD AUTO: 0.8 X10E3/UL (ref 0.1–0.9)
MONOCYTES NFR BLD AUTO: 7 %
NEUTROPHILS # BLD AUTO: 7.2 X10E3/UL (ref 1.4–7)
NEUTROPHILS NFR BLD AUTO: 63 %
PLATELET # BLD AUTO: 248 X10E3/UL (ref 150–450)
RBC # BLD AUTO: 4.76 X10E6/UL (ref 4.14–5.8)
WBC # BLD AUTO: 11.3 X10E3/UL (ref 3.4–10.8)

## 2023-03-14 NOTE — ASSESSMENT & PLAN NOTE
Currently following with pulmonology and infectious disease.  Awaiting sensitivity studies to determine IV antibiotic treatment.    I spent 30 minutes reviewing recent medical records, discussing current symptoms with the patient, discussing treatment plan, and on documentation.

## 2023-03-29 ENCOUNTER — TELEPHONE (OUTPATIENT)
Dept: FAMILY MEDICINE CLINIC | Facility: CLINIC | Age: 58
End: 2023-03-29

## 2023-03-29 NOTE — TELEPHONE ENCOUNTER
Caller: Errol Cortez    Relationship to patient: Self    Best call back number: [phone]    Patient is needing: PATIENT STATED HE WILL NEED PHENERGAN 'WITHOUT DM' PER INFECTIOUS DISEASE DOCTOR.    PHARMACY:    [id number] [place], [place] - [address]  & [address] - [phone]  - [phone] FX  [phone]

## 2023-03-31 RX ORDER — PROMETHAZINE HYDROCHLORIDE 25 MG/1
25 TABLET ORAL EVERY 6 HOURS PRN
Qty: 30 TABLET | Refills: 5 | Status: SHIPPED | OUTPATIENT
Start: 2023-03-31

## 2023-03-31 NOTE — TELEPHONE ENCOUNTER
He spoke with the infectious disease he wants him to take phenergan without the decongestant. He is getting ready to start treatment for ANGELA. Please advise

## 2023-04-12 ENCOUNTER — LAB (OUTPATIENT)
Dept: FAMILY MEDICINE CLINIC | Facility: CLINIC | Age: 58
End: 2023-04-12
Payer: MEDICARE

## 2023-04-12 DIAGNOSIS — A31.0 MAIC (MYCOBACTERIUM AVIUM-INTRACELLULARE COMPLEX): Primary | ICD-10-CM

## 2023-04-13 LAB
ALBUMIN SERPL-MCNC: 4.1 G/DL (ref 3.8–4.9)
ALBUMIN/GLOB SERPL: 1.2 {RATIO} (ref 1.2–2.2)
ALP SERPL-CCNC: 88 IU/L (ref 44–121)
ALT SERPL-CCNC: 10 IU/L (ref 0–44)
AST SERPL-CCNC: 13 IU/L (ref 0–40)
BASOPHILS # BLD AUTO: 0.1 X10E3/UL (ref 0–0.2)
BASOPHILS NFR BLD AUTO: 1 %
BILIRUB SERPL-MCNC: <0.2 MG/DL (ref 0–1.2)
BUN SERPL-MCNC: 13 MG/DL (ref 6–24)
BUN/CREAT SERPL: 14 (ref 9–20)
CALCIUM SERPL-MCNC: 9.7 MG/DL (ref 8.7–10.2)
CHLORIDE SERPL-SCNC: 98 MMOL/L (ref 96–106)
CO2 SERPL-SCNC: 27 MMOL/L (ref 20–29)
CREAT SERPL-MCNC: 0.9 MG/DL (ref 0.76–1.27)
EGFRCR SERPLBLD CKD-EPI 2021: 100 ML/MIN/1.73
EOSINOPHIL # BLD AUTO: 0.3 X10E3/UL (ref 0–0.4)
EOSINOPHIL NFR BLD AUTO: 3 %
ERYTHROCYTE [DISTWIDTH] IN BLOOD BY AUTOMATED COUNT: 12.4 % (ref 11.6–15.4)
GLOBULIN SER CALC-MCNC: 3.5 G/DL (ref 1.5–4.5)
GLUCOSE SERPL-MCNC: 60 MG/DL (ref 70–99)
HCT VFR BLD AUTO: 44.3 % (ref 37.5–51)
HGB BLD-MCNC: 14.8 G/DL (ref 13–17.7)
IMM GRANULOCYTES # BLD AUTO: 0 X10E3/UL (ref 0–0.1)
IMM GRANULOCYTES NFR BLD AUTO: 0 %
LYMPHOCYTES # BLD AUTO: 2.7 X10E3/UL (ref 0.7–3.1)
LYMPHOCYTES NFR BLD AUTO: 34 %
MCH RBC QN AUTO: 29 PG (ref 26.6–33)
MCHC RBC AUTO-ENTMCNC: 33.4 G/DL (ref 31.5–35.7)
MCV RBC AUTO: 87 FL (ref 79–97)
MONOCYTES # BLD AUTO: 0.5 X10E3/UL (ref 0.1–0.9)
MONOCYTES NFR BLD AUTO: 6 %
NEUTROPHILS # BLD AUTO: 4.4 X10E3/UL (ref 1.4–7)
NEUTROPHILS NFR BLD AUTO: 56 %
PLATELET # BLD AUTO: 256 X10E3/UL (ref 150–450)
POTASSIUM SERPL-SCNC: 5.6 MMOL/L (ref 3.5–5.2)
PROT SERPL-MCNC: 7.6 G/DL (ref 6–8.5)
RBC # BLD AUTO: 5.11 X10E6/UL (ref 4.14–5.8)
SODIUM SERPL-SCNC: 138 MMOL/L (ref 134–144)
WBC # BLD AUTO: 8 X10E3/UL (ref 3.4–10.8)

## 2023-09-08 ENCOUNTER — OFFICE VISIT (OUTPATIENT)
Dept: FAMILY MEDICINE CLINIC | Facility: CLINIC | Age: 58
End: 2023-09-08
Payer: MEDICARE

## 2023-09-08 VITALS
WEIGHT: 128.7 LBS | HEIGHT: 73 IN | DIASTOLIC BLOOD PRESSURE: 82 MMHG | OXYGEN SATURATION: 92 % | TEMPERATURE: 97.3 F | BODY MASS INDEX: 17.06 KG/M2 | HEART RATE: 70 BPM | SYSTOLIC BLOOD PRESSURE: 140 MMHG

## 2023-09-08 DIAGNOSIS — Z00.00 ENCOUNTER FOR WELLNESS EXAMINATION IN ADULT: Primary | ICD-10-CM

## 2023-09-08 DIAGNOSIS — A31.0 MAI (MYCOBACTERIUM AVIUM-INTRACELLULARE): ICD-10-CM

## 2023-09-08 DIAGNOSIS — Z12.12 SCREENING FOR COLORECTAL CANCER: ICD-10-CM

## 2023-09-08 DIAGNOSIS — Z12.11 SCREENING FOR COLORECTAL CANCER: ICD-10-CM

## 2023-09-08 DIAGNOSIS — E78.2 MIXED HYPERLIPIDEMIA: ICD-10-CM

## 2023-09-08 DIAGNOSIS — R63.6 UNDERWEIGHT: ICD-10-CM

## 2023-09-08 DIAGNOSIS — Z12.5 PROSTATE CANCER SCREENING: ICD-10-CM

## 2023-09-08 DIAGNOSIS — R10.12 LUQ ABDOMINAL PAIN: ICD-10-CM

## 2023-09-08 RX ORDER — ALBUTEROL SULFATE 90 UG/1
2 AEROSOL, METERED RESPIRATORY (INHALATION) SEE ADMIN INSTRUCTIONS
COMMUNITY
Start: 2023-06-27

## 2023-09-08 RX ORDER — ETHAMBUTOL HYDROCHLORIDE 400 MG/1
1200 TABLET, FILM COATED ORAL DAILY
COMMUNITY
Start: 2023-03-29

## 2023-09-08 RX ORDER — RIFAMPIN 300 MG/1
600 CAPSULE ORAL DAILY
COMMUNITY
Start: 2023-08-24

## 2023-09-08 RX ORDER — AZITHROMYCIN 500 MG/1
500 TABLET, FILM COATED ORAL DAILY
COMMUNITY
Start: 2023-03-29

## 2023-09-08 NOTE — PROGRESS NOTES
The ABCs of the Annual Wellness Visit  Initial Medicare Wellness Visit    Subjective     Errol Cortez is a 57 y.o. male who presents for an Initial Medicare Wellness Visit.  Chronic medical conditions include GERD, hyperlipidemia, history of opioid abuse currently on Suboxone therapy, emphysema, and current Mycobacterium AVM intracellular infection.  He is due for colon screening.    The following portions of the patient's history were reviewed and   updated as appropriate: allergies, current medications, past family history, past medical history, past social history, past surgical history, and problem list.     Compared to one year ago, the patient feels his physical   health is worse.    Compared to one year ago, the patient feels his mental   health is the same.    Recent Hospitalizations:  He was not admitted to the hospital during the last year.       Current Medical Providers:  Patient Care Team:  Karmen Yousif DO as PCP - General (Family Medicine)    Outpatient Medications Prior to Visit   Medication Sig Dispense Refill    albuterol sulfate  (90 Base) MCG/ACT inhaler Inhale 2 puffs See Admin Instructions. inhale 2 puffs by mouth every 4 to 6 hours as needed      azithromycin (ZITHROMAX) 500 MG tablet Take 1 tablet by mouth Daily.      Bevespi Aerosphere 9-4.8 MCG/ACT aerosol INHALE 2 PUFFS BY MOUTH 1 TIME EACH DAY      budesonide (PULMICORT) 0.5 MG/2ML nebulizer solution       buprenorphine-naloxone (SUBOXONE) 8-2 MG per SL tablet DISSOLVE 2 TABLET UNDER THE TONGUE EVERY DAY      ethambutol (MYAMBUTOL) 400 MG tablet Take 3 tablets by mouth Daily.      fluticasone (FLONASE) 50 MCG/ACT nasal spray SHAKE GENTLY AND INHALE 2 PUFFS IN EACH NOSTRIL EVERY DAY      omeprazole (priLOSEC) 40 MG capsule Take 1 capsule by mouth Daily. 30 capsule 5    ondansetron ODT (ZOFRAN-ODT) 8 MG disintegrating tablet Place 1 tablet on the tongue Every 8 (Eight) Hours As Needed for Nausea or Vomiting. 30 tablet 5  "   promethazine (PHENERGAN) 25 MG tablet Take 1 tablet by mouth Every 6 (Six) Hours As Needed for Nausea or Vomiting. 30 tablet 5    rifAMPin (RIFADIN) 300 MG capsule Take 2 capsules by mouth Daily.      sildenafil (Viagra) 50 MG tablet Take 1 tablet by mouth Daily As Needed for Erectile Dysfunction. 30 tablet 5     No facility-administered medications prior to visit.       Opioid medication/s are on active medication list.  and I have evaluated his active treatment plan and pain score trends (see table).  There were no vitals filed for this visit.  I have reviewed the chart for potential of high risk medication and harmful drug interactions in the elderly.          Aspirin is not on active medication list.  Aspirin use is not indicated based on review of current medical condition/s. Risk of harm outweighs potential benefits.  .    Patient Active Problem List   Diagnosis    Hyperlipidemia    Pulmonary emphysema    Weight loss, abnormal    Erectile dysfunction    Chronic pain of left knee    Dysuria    Acute cough    Influenza A    ANGELA (mycobacterium avium-intracellulare)    Leukocytosis    Encounter for wellness examination in adult    Screening for colorectal cancer    LUQ abdominal pain    Underweight     Advance Care Planning   Advance Care Planning     Advance Directive is not on file.  ACP discussion was held with the patient during this visit. Patient has an advance directive (not in EMR), copy requested.       Objective    Vitals:    09/08/23 1032   BP: 140/82   BP Location: Left arm   Patient Position: Sitting   Cuff Size: Adult   Pulse: 70   Temp: 97.3 °F (36.3 °C)   TempSrc: Temporal   SpO2: 92%   Weight: 58.4 kg (128 lb 11.2 oz)   Height: 185.4 cm (73\")     Estimated body mass index is 16.98 kg/m² as calculated from the following:    Height as of this encounter: 185.4 cm (73\").    Weight as of this encounter: 58.4 kg (128 lb 11.2 oz).    BMI is below normal parameters (malnutrition). Recommendations: " treating the underlying disease process      Does the patient have evidence of cognitive impairment?   No          HEALTH RISK ASSESSMENT    Smoking Status:  Social History     Tobacco Use   Smoking Status Some Days    Packs/day: 0.50    Years: 40.00    Pack years: 20.00    Types: Cigarettes   Smokeless Tobacco Never     Alcohol Consumption:  Social History     Substance and Sexual Activity   Alcohol Use Never     Fall Risk Screen:    SHAR Fall Risk Assessment was completed, and patient is at LOW risk for falls.Assessment completed on:2023    Depression Screen:       2023    10:38 AM   PHQ-2/PHQ-9 Depression Screening   Little Interest or Pleasure in Doing Things 0-->not at all   Feeling Down, Depressed or Hopeless 0-->not at all   PHQ-9: Brief Depression Severity Measure Score 0       Health Habits and Functional and Cognitive Screenin/8/2023    10:38 AM   Functional & Cognitive Status   Do you have difficulty preparing food and eating? No   Do you have difficulty bathing yourself, getting dressed or grooming yourself? No   Do you have difficulty using the toilet? No   Do you have difficulty moving around from place to place? No   Do you have trouble with steps or getting out of a bed or a chair? No   Current Diet Well Balanced Diet   Dental Exam Not up to date   Eye Exam Not up to date   Exercise (times per week) 0 times per week   Current Exercises Include No Regular Exercise   Do you need help using the phone?  No   Are you deaf or do you have serious difficulty hearing?  No   Do you need help to go to places out of walking distance? No   Do you need help shopping? No   Do you need help preparing meals?  No   Do you need help with housework?  No   Do you need help with laundry? No   Do you need help taking your medications? No   Do you need help managing money? No   Have you felt unusual stress, anger or loneliness in the last month? No   Who do you live with? Spouse   If you need help, do you  have trouble finding someone available to you? No   Have you been bothered in the last four weeks by sexual problems? No   Do you have difficulty concentrating, remembering or making decisions? No       Age-appropriate Screening Schedule:  Refer to the list below for future screening recommendations based on patient's age, sex and/or medical conditions. Orders for these recommended tests are listed in the plan section. The patient has been provided with a written plan.    Health Maintenance   Topic Date Due    BMI FOLLOWUP  Never done    COLORECTAL CANCER SCREENING  Never done    LIPID PANEL  Never done    ZOSTER VACCINE (2 of 2) 09/08/2023 (Originally 2/22/2021)    TDAP/TD VACCINES (1 - Tdap) 09/08/2024 (Originally [birth year]/1984)    INFLUENZA VACCINE  10/01/2023    LUNG CANCER SCREENING  01/09/2024    ANNUAL WELLNESS VISIT  09/08/2024    Pneumococcal Vaccine 0-64 (3 - PPSV23 or PCV20) 11/16/2030    HEPATITIS C SCREENING  Completed    COVID-19 Vaccine  Completed          CMS Preventative Services Quick Reference  Risk Factors Identified During Encounter    None Identified    The above risks/problems have been discussed with the patient.  Pertinent information has been shared with the patient in the After Visit Summary.  An After Visit Summary and PPPS were made available to the patient.  Diagnoses and all orders for this visit:    1. Encounter for wellness examination in adult (Primary)  Assessment & Plan:  Fasting labs today, Cologuard ordered    Orders:  -     Hemoglobin A1c; Future  -     CBC Auto Differential; Future  -     Comprehensive Metabolic Panel; Future  -     Lipid Panel; Future  -     TSH; Future  -     T4, Free; Future  -     Hemoglobin A1c  -     CBC Auto Differential  -     Comprehensive Metabolic Panel  -     Lipid Panel  -     TSH  -     T4, Free    2. LUQ abdominal pain  Assessment & Plan:  Unclear etiology and just found on exam today.  We will proceed with KUB for further evaluation.    Orders:  -      XR Abdomen KUB; Future    3. ANGELA (mycobacterium avium-intracellulare)  Assessment & Plan:  Unfortunately not improving, followed by infectious disease    Orders:  -     XR Abdomen KUB; Future    4. Screening for colorectal cancer  -     Cologuard - Stool, Per Rectum; Future    5. Prostate cancer screening  -     PSA Screen; Future  -     PSA Screen    6. Mixed hyperlipidemia  Assessment & Plan:  Lipid abnormalities are  stable .  Nutritional counseling was provided. and Pharmacotherapy as ordered.  Lipids will be reassessed in 6 months.      7. Underweight  Assessment & Plan:  Secondary to MAC, patient is followed by infectious disease and undergoing treatment currently.        Follow Up:  Next Medicare Wellness visit to be scheduled in 1 year.        Additional E&M Note during same encounter follows:  Patient has multiple medical problems which are significant and separately identifiable that require additional work above and beyond the Medicare Wellness Visit.      Chief Complaint  Medicare Wellness-subsequent    Subjective        HPI  Errol Cortez is also being seen today for incidental left upper quadrant abdominal pain found on physical exam.  The patient had not experienced this before but is very tender to palpate under the left lower ribs and left lateral upper abdomen.  He denies changes in bowel movements and reports that he has regular soft bowel movements daily.  He denies blood in his stool, or worsening nausea or vomiting.    Review of Systems   Constitutional:  Negative for chills, fatigue and fever.   Respiratory:  Negative for cough, chest tightness, shortness of breath and wheezing.    Cardiovascular:  Negative for chest pain, palpitations and leg swelling.   Gastrointestinal:  Negative for abdominal pain, constipation, diarrhea, nausea and vomiting.   Neurological:  Negative for dizziness, weakness and light-headedness.   Psychiatric/Behavioral:  Negative for dysphoric mood. The patient is not  "nervous/anxious.      Objective   Vital Signs:  /82 (BP Location: Left arm, Patient Position: Sitting, Cuff Size: Adult)   Pulse 70   Temp 97.3 °F (36.3 °C) (Temporal)   Ht 185.4 cm (73\")   Wt 58.4 kg (128 lb 11.2 oz)   SpO2 92%   BMI 16.98 kg/m²     Physical Exam  Vitals and nursing note reviewed.   Constitutional:       Comments: Chronically ill-appearing, underweight   HENT:      Head: Normocephalic and atraumatic.      Right Ear: Tympanic membrane and ear canal normal.      Left Ear: Tympanic membrane and ear canal normal.      Nose: Nose normal.      Mouth/Throat:      Mouth: Mucous membranes are moist.      Pharynx: Oropharynx is clear.   Eyes:      Conjunctiva/sclera: Conjunctivae normal.      Pupils: Pupils are equal, round, and reactive to light.   Cardiovascular:      Rate and Rhythm: Normal rate and regular rhythm.      Heart sounds: Normal heart sounds. No murmur heard.  Pulmonary:      Effort: Pulmonary effort is normal.      Breath sounds: Examination of the right-upper field reveals wheezing. Examination of the left-upper field reveals wheezing. Wheezing present. No decreased breath sounds, rhonchi or rales.   Abdominal:      General: Bowel sounds are normal.      Palpations: Abdomen is soft.      Tenderness: There is no abdominal tenderness.   Musculoskeletal:         General: Normal range of motion.      Cervical back: Normal range of motion and neck supple.      Right lower leg: No edema.      Left lower leg: No edema.   Lymphadenopathy:      Cervical: No cervical adenopathy.   Skin:     General: Skin is warm.      Findings: No rash.   Neurological:      General: No focal deficit present.      Mental Status: He is alert and oriented to person, place, and time.      Motor: No weakness.   Psychiatric:         Mood and Affect: Mood normal.         Behavior: Behavior normal.                       Assessment and Plan   Diagnoses and all orders for this visit:    1. Encounter for wellness " examination in adult (Primary)  Assessment & Plan:  Fasting labs today, Cologuard ordered    Orders:  -     Hemoglobin A1c; Future  -     CBC Auto Differential; Future  -     Comprehensive Metabolic Panel; Future  -     Lipid Panel; Future  -     TSH; Future  -     T4, Free; Future  -     Hemoglobin A1c  -     CBC Auto Differential  -     Comprehensive Metabolic Panel  -     Lipid Panel  -     TSH  -     T4, Free    2. LUQ abdominal pain  Assessment & Plan:  Unclear etiology and just found on exam today.  We will proceed with KUB for further evaluation.    Orders:  -     XR Abdomen KUB; Future    3. ANGELA (mycobacterium avium-intracellulare)  Assessment & Plan:  Unfortunately not improving, followed by infectious disease    Orders:  -     XR Abdomen KUB; Future    4. Screening for colorectal cancer  -     Cologuard - Stool, Per Rectum; Future    5. Prostate cancer screening  -     PSA Screen; Future  -     PSA Screen    6. Mixed hyperlipidemia  Assessment & Plan:  Lipid abnormalities are  stable .  Nutritional counseling was provided. and Pharmacotherapy as ordered.  Lipids will be reassessed in 6 months.      7. Underweight  Assessment & Plan:  Secondary to MAC, patient is followed by infectious disease and undergoing treatment currently.      Discussed injury prevention, diet and exercise, safe sexual practices, and screening for common diseases. Encouraged use of sunscreen and seatbelts. Discussed timing of colon cancer cancer screening, prostate cancer screening, and review of skin for lesions. Avoidance of tobacco encouraged. Limitation or avoidance of alcohol encouraged. Recommend yearly dental and eye exams. Also discussed monitoring of blood pressure and lipids.            Follow Up   Return in about 6 months (around 3/8/2024) for Recheck with labs.  Patient was given instructions and counseling regarding his condition or for health maintenance advice. Please see specific information pulled into the AVS if  appropriate.

## 2023-09-09 LAB
ALBUMIN SERPL-MCNC: 4 G/DL (ref 3.8–4.9)
ALBUMIN/GLOB SERPL: 1.1 {RATIO} (ref 1.2–2.2)
ALP SERPL-CCNC: 83 IU/L (ref 44–121)
ALT SERPL-CCNC: 9 IU/L (ref 0–44)
AST SERPL-CCNC: 15 IU/L (ref 0–40)
BASOPHILS # BLD AUTO: 0.1 X10E3/UL (ref 0–0.2)
BASOPHILS NFR BLD AUTO: 1 %
BILIRUB SERPL-MCNC: 0.2 MG/DL (ref 0–1.2)
BUN SERPL-MCNC: 13 MG/DL (ref 6–24)
BUN/CREAT SERPL: 14 (ref 9–20)
CALCIUM SERPL-MCNC: 9.2 MG/DL (ref 8.7–10.2)
CHLORIDE SERPL-SCNC: 99 MMOL/L (ref 96–106)
CHOLEST SERPL-MCNC: 214 MG/DL (ref 100–199)
CO2 SERPL-SCNC: 25 MMOL/L (ref 20–29)
CREAT SERPL-MCNC: 0.94 MG/DL (ref 0.76–1.27)
EGFRCR SERPLBLD CKD-EPI 2021: 95 ML/MIN/1.73
EOSINOPHIL # BLD AUTO: 0.2 X10E3/UL (ref 0–0.4)
EOSINOPHIL NFR BLD AUTO: 3 %
ERYTHROCYTE [DISTWIDTH] IN BLOOD BY AUTOMATED COUNT: 13.1 % (ref 11.6–15.4)
GLOBULIN SER CALC-MCNC: 3.5 G/DL (ref 1.5–4.5)
GLUCOSE SERPL-MCNC: 97 MG/DL (ref 70–99)
HBA1C MFR BLD: 5.8 % (ref 4.8–5.6)
HCT VFR BLD AUTO: 42.4 % (ref 37.5–51)
HDLC SERPL-MCNC: 75 MG/DL
HGB BLD-MCNC: 13.9 G/DL (ref 13–17.7)
IMM GRANULOCYTES # BLD AUTO: 0 X10E3/UL (ref 0–0.1)
IMM GRANULOCYTES NFR BLD AUTO: 0 %
LDLC SERPL CALC-MCNC: 124 MG/DL (ref 0–99)
LYMPHOCYTES # BLD AUTO: 1.9 X10E3/UL (ref 0.7–3.1)
LYMPHOCYTES NFR BLD AUTO: 34 %
MCH RBC QN AUTO: 28.4 PG (ref 26.6–33)
MCHC RBC AUTO-ENTMCNC: 32.8 G/DL (ref 31.5–35.7)
MCV RBC AUTO: 87 FL (ref 79–97)
MONOCYTES # BLD AUTO: 0.4 X10E3/UL (ref 0.1–0.9)
MONOCYTES NFR BLD AUTO: 6 %
NEUTROPHILS # BLD AUTO: 3.2 X10E3/UL (ref 1.4–7)
NEUTROPHILS NFR BLD AUTO: 56 %
PLATELET # BLD AUTO: 211 X10E3/UL (ref 150–450)
POTASSIUM SERPL-SCNC: 5.1 MMOL/L (ref 3.5–5.2)
PROT SERPL-MCNC: 7.5 G/DL (ref 6–8.5)
PSA SERPL-MCNC: 0.6 NG/ML (ref 0–4)
RBC # BLD AUTO: 4.89 X10E6/UL (ref 4.14–5.8)
SODIUM SERPL-SCNC: 140 MMOL/L (ref 134–144)
T4 FREE SERPL-MCNC: 1.13 NG/DL (ref 0.82–1.77)
TRIGL SERPL-MCNC: 83 MG/DL (ref 0–149)
TSH SERPL DL<=0.005 MIU/L-ACNC: 1.67 UIU/ML (ref 0.45–4.5)
VLDLC SERPL CALC-MCNC: 15 MG/DL (ref 5–40)
WBC # BLD AUTO: 5.7 X10E3/UL (ref 3.4–10.8)